# Patient Record
Sex: FEMALE | URBAN - METROPOLITAN AREA
[De-identification: names, ages, dates, MRNs, and addresses within clinical notes are randomized per-mention and may not be internally consistent; named-entity substitution may affect disease eponyms.]

---

## 2019-11-04 ENCOUNTER — INPATIENT (INPATIENT)
Facility: HOSPITAL | Age: 29
LOS: 2 days | Discharge: HOME | End: 2019-11-07
Attending: OBSTETRICS & GYNECOLOGY | Admitting: OBSTETRICS & GYNECOLOGY
Payer: MEDICAID

## 2019-11-04 VITALS — WEIGHT: 165.35 LBS | HEIGHT: 60 IN

## 2019-11-04 DIAGNOSIS — O26.899 OTHER SPECIFIED PREGNANCY RELATED CONDITIONS, UNSPECIFIED TRIMESTER: ICD-10-CM

## 2019-11-04 DIAGNOSIS — Z34.80 ENCOUNTER FOR SUPERVISION OF OTHER NORMAL PREGNANCY, UNSPECIFIED TRIMESTER: ICD-10-CM

## 2019-11-04 DIAGNOSIS — Z3A.00 WEEKS OF GESTATION OF PREGNANCY NOT SPECIFIED: ICD-10-CM

## 2019-11-04 LAB
ABO RH CONFIRMATION: SIGNIFICANT CHANGE UP
ALBUMIN SERPL ELPH-MCNC: 2.3 G/DL — LOW (ref 3.5–5)
ALP SERPL-CCNC: 165 U/L — HIGH (ref 40–120)
ALT FLD-CCNC: 17 U/L DA — SIGNIFICANT CHANGE UP (ref 10–60)
ANION GAP SERPL CALC-SCNC: 7 MMOL/L — SIGNIFICANT CHANGE UP (ref 5–17)
APPEARANCE UR: CLEAR — SIGNIFICANT CHANGE UP
APTT BLD: 29 SEC — SIGNIFICANT CHANGE UP (ref 27.5–36.3)
AST SERPL-CCNC: 20 U/L — SIGNIFICANT CHANGE UP (ref 10–40)
BASOPHILS # BLD AUTO: 0.06 K/UL — SIGNIFICANT CHANGE UP (ref 0–0.2)
BASOPHILS NFR BLD AUTO: 0.4 % — SIGNIFICANT CHANGE UP (ref 0–2)
BILIRUB SERPL-MCNC: 0.4 MG/DL — SIGNIFICANT CHANGE UP (ref 0.2–1.2)
BILIRUB UR-MCNC: NEGATIVE — SIGNIFICANT CHANGE UP
BLD GP AB SCN SERPL QL: SIGNIFICANT CHANGE UP
BUN SERPL-MCNC: 14 MG/DL — SIGNIFICANT CHANGE UP (ref 7–18)
CALCIUM SERPL-MCNC: 8.7 MG/DL — SIGNIFICANT CHANGE UP (ref 8.4–10.5)
CHLORIDE SERPL-SCNC: 112 MMOL/L — HIGH (ref 96–108)
CO2 SERPL-SCNC: 20 MMOL/L — LOW (ref 22–31)
COLOR SPEC: YELLOW — SIGNIFICANT CHANGE UP
CREAT ?TM UR-MCNC: 47 MG/DL — SIGNIFICANT CHANGE UP
CREAT SERPL-MCNC: 0.88 MG/DL — SIGNIFICANT CHANGE UP (ref 0.5–1.3)
DIFF PNL FLD: ABNORMAL
EOSINOPHIL # BLD AUTO: 0.12 K/UL — SIGNIFICANT CHANGE UP (ref 0–0.5)
EOSINOPHIL NFR BLD AUTO: 0.8 % — SIGNIFICANT CHANGE UP (ref 0–6)
FIBRINOGEN PPP-MCNC: 1002 MG/DL — HIGH (ref 350–510)
GLUCOSE BLDC GLUCOMTR-MCNC: 76 MG/DL — SIGNIFICANT CHANGE UP (ref 70–99)
GLUCOSE BLDC GLUCOMTR-MCNC: 80 MG/DL — SIGNIFICANT CHANGE UP (ref 70–99)
GLUCOSE SERPL-MCNC: 72 MG/DL — SIGNIFICANT CHANGE UP (ref 70–99)
GLUCOSE UR QL: NEGATIVE — SIGNIFICANT CHANGE UP
HCT VFR BLD CALC: 33.7 % — LOW (ref 34.5–45)
HGB BLD-MCNC: 11.2 G/DL — LOW (ref 11.5–15.5)
IMM GRANULOCYTES NFR BLD AUTO: 1 % — SIGNIFICANT CHANGE UP (ref 0–1.5)
INR BLD: 0.92 RATIO — SIGNIFICANT CHANGE UP (ref 0.88–1.16)
KETONES UR-MCNC: NEGATIVE — SIGNIFICANT CHANGE UP
LDH SERPL L TO P-CCNC: 123 U/L — SIGNIFICANT CHANGE UP (ref 120–225)
LEUKOCYTE ESTERASE UR-ACNC: NEGATIVE — SIGNIFICANT CHANGE UP
LYMPHOCYTES # BLD AUTO: 22.4 % — SIGNIFICANT CHANGE UP (ref 13–44)
LYMPHOCYTES # BLD AUTO: 3.25 K/UL — SIGNIFICANT CHANGE UP (ref 1–3.3)
MCHC RBC-ENTMCNC: 30.8 PG — SIGNIFICANT CHANGE UP (ref 27–34)
MCHC RBC-ENTMCNC: 33.2 GM/DL — SIGNIFICANT CHANGE UP (ref 32–36)
MCV RBC AUTO: 92.6 FL — SIGNIFICANT CHANGE UP (ref 80–100)
MONOCYTES # BLD AUTO: 0.7 K/UL — SIGNIFICANT CHANGE UP (ref 0–0.9)
MONOCYTES NFR BLD AUTO: 4.8 % — SIGNIFICANT CHANGE UP (ref 2–14)
NEUTROPHILS # BLD AUTO: 10.22 K/UL — HIGH (ref 1.8–7.4)
NEUTROPHILS NFR BLD AUTO: 70.6 % — SIGNIFICANT CHANGE UP (ref 43–77)
NITRITE UR-MCNC: NEGATIVE — SIGNIFICANT CHANGE UP
NRBC # BLD: 0 /100 WBCS — SIGNIFICANT CHANGE UP (ref 0–0)
PH UR: 6 — SIGNIFICANT CHANGE UP (ref 5–8)
PLATELET # BLD AUTO: 214 K/UL — SIGNIFICANT CHANGE UP (ref 150–400)
POTASSIUM SERPL-MCNC: 4.5 MMOL/L — SIGNIFICANT CHANGE UP (ref 3.5–5.3)
POTASSIUM SERPL-SCNC: 4.5 MMOL/L — SIGNIFICANT CHANGE UP (ref 3.5–5.3)
PROT ?TM UR-MCNC: 28 MG/DL — HIGH (ref 0–12)
PROT SERPL-MCNC: 7.1 G/DL — SIGNIFICANT CHANGE UP (ref 6–8.3)
PROT UR-MCNC: 30 MG/DL
PROTHROM AB SERPL-ACNC: 10.2 SEC — SIGNIFICANT CHANGE UP (ref 10–12.9)
RBC # BLD: 3.64 M/UL — LOW (ref 3.8–5.2)
RBC # FLD: 13.5 % — SIGNIFICANT CHANGE UP (ref 10.3–14.5)
SODIUM SERPL-SCNC: 139 MMOL/L — SIGNIFICANT CHANGE UP (ref 135–145)
SP GR SPEC: 1.01 — SIGNIFICANT CHANGE UP (ref 1.01–1.02)
URATE SERPL-MCNC: 7.6 MG/DL — HIGH (ref 2.5–7)
UROBILINOGEN FLD QL: NEGATIVE — SIGNIFICANT CHANGE UP
WBC # BLD: 14.49 K/UL — HIGH (ref 3.8–10.5)
WBC # FLD AUTO: 14.49 K/UL — HIGH (ref 3.8–10.5)

## 2019-11-04 PROCEDURE — 76818 FETAL BIOPHYS PROFILE W/NST: CPT | Mod: 26

## 2019-11-04 RX ORDER — SODIUM CHLORIDE 9 MG/ML
1000 INJECTION, SOLUTION INTRAVENOUS ONCE
Refills: 0 | Status: COMPLETED | OUTPATIENT
Start: 2019-11-04 | End: 2019-11-04

## 2019-11-04 RX ORDER — FAMOTIDINE 10 MG/ML
20 INJECTION INTRAVENOUS ONCE
Refills: 0 | Status: DISCONTINUED | OUTPATIENT
Start: 2019-11-04 | End: 2019-11-05

## 2019-11-04 RX ORDER — OXYTOCIN 10 UNIT/ML
333.33 VIAL (ML) INJECTION
Qty: 20 | Refills: 0 | Status: DISCONTINUED | OUTPATIENT
Start: 2019-11-04 | End: 2019-11-05

## 2019-11-04 RX ORDER — CEFAZOLIN SODIUM 1 G
2000 VIAL (EA) INJECTION ONCE
Refills: 0 | Status: COMPLETED | OUTPATIENT
Start: 2019-11-04 | End: 2019-11-04

## 2019-11-04 RX ORDER — SODIUM CHLORIDE 9 MG/ML
1000 INJECTION, SOLUTION INTRAVENOUS
Refills: 0 | Status: DISCONTINUED | OUTPATIENT
Start: 2019-11-04 | End: 2019-11-04

## 2019-11-04 RX ORDER — METOCLOPRAMIDE HCL 10 MG
10 TABLET ORAL ONCE
Refills: 0 | Status: DISCONTINUED | OUTPATIENT
Start: 2019-11-04 | End: 2019-11-05

## 2019-11-04 RX ORDER — CITRIC ACID/SODIUM CITRATE 300-500 MG
30 SOLUTION, ORAL ORAL ONCE
Refills: 0 | Status: COMPLETED | OUTPATIENT
Start: 2019-11-04 | End: 2019-11-04

## 2019-11-04 RX ADMIN — Medication 100 MILLIGRAM(S): at 23:00

## 2019-11-04 RX ADMIN — Medication 30 MILLILITER(S): at 23:01

## 2019-11-04 RX ADMIN — Medication 1000 MILLIUNIT(S)/MIN: at 23:48

## 2019-11-04 RX ADMIN — SODIUM CHLORIDE 2000 MILLILITER(S): 9 INJECTION, SOLUTION INTRAVENOUS at 21:05

## 2019-11-05 DIAGNOSIS — O14.90 UNSPECIFIED PRE-ECLAMPSIA, UNSPECIFIED TRIMESTER: ICD-10-CM

## 2019-11-05 DIAGNOSIS — O24.419 GESTATIONAL DIABETES MELLITUS IN PREGNANCY, UNSPECIFIED CONTROL: ICD-10-CM

## 2019-11-05 DIAGNOSIS — I10 ESSENTIAL (PRIMARY) HYPERTENSION: ICD-10-CM

## 2019-11-05 LAB
BASOPHILS # BLD AUTO: 0.04 K/UL — SIGNIFICANT CHANGE UP (ref 0–0.2)
BASOPHILS NFR BLD AUTO: 0.3 % — SIGNIFICANT CHANGE UP (ref 0–2)
EOSINOPHIL # BLD AUTO: 0.05 K/UL — SIGNIFICANT CHANGE UP (ref 0–0.5)
EOSINOPHIL NFR BLD AUTO: 0.4 % — SIGNIFICANT CHANGE UP (ref 0–6)
GLUCOSE BLDC GLUCOMTR-MCNC: 101 MG/DL — HIGH (ref 70–99)
GLUCOSE BLDC GLUCOMTR-MCNC: 119 MG/DL — HIGH (ref 70–99)
GLUCOSE BLDC GLUCOMTR-MCNC: 143 MG/DL — HIGH (ref 70–99)
GLUCOSE BLDC GLUCOMTR-MCNC: 98 MG/DL — SIGNIFICANT CHANGE UP (ref 70–99)
HCT VFR BLD CALC: 30.3 % — LOW (ref 34.5–45)
HGB BLD-MCNC: 9.8 G/DL — LOW (ref 11.5–15.5)
IMM GRANULOCYTES NFR BLD AUTO: 0.5 % — SIGNIFICANT CHANGE UP (ref 0–1.5)
LYMPHOCYTES # BLD AUTO: 16.3 % — SIGNIFICANT CHANGE UP (ref 13–44)
LYMPHOCYTES # BLD AUTO: 2.27 K/UL — SIGNIFICANT CHANGE UP (ref 1–3.3)
MCHC RBC-ENTMCNC: 30.3 PG — SIGNIFICANT CHANGE UP (ref 27–34)
MCHC RBC-ENTMCNC: 32.3 GM/DL — SIGNIFICANT CHANGE UP (ref 32–36)
MCV RBC AUTO: 93.8 FL — SIGNIFICANT CHANGE UP (ref 80–100)
MONOCYTES # BLD AUTO: 0.81 K/UL — SIGNIFICANT CHANGE UP (ref 0–0.9)
MONOCYTES NFR BLD AUTO: 5.8 % — SIGNIFICANT CHANGE UP (ref 2–14)
NEUTROPHILS # BLD AUTO: 10.7 K/UL — HIGH (ref 1.8–7.4)
NEUTROPHILS NFR BLD AUTO: 76.7 % — SIGNIFICANT CHANGE UP (ref 43–77)
NRBC # BLD: 0 /100 WBCS — SIGNIFICANT CHANGE UP (ref 0–0)
PLATELET # BLD AUTO: 187 K/UL — SIGNIFICANT CHANGE UP (ref 150–400)
RBC # BLD: 3.23 M/UL — LOW (ref 3.8–5.2)
RBC # FLD: 13.3 % — SIGNIFICANT CHANGE UP (ref 10.3–14.5)
T PALLIDUM AB TITR SER: NEGATIVE — SIGNIFICANT CHANGE UP
WBC # BLD: 13.94 K/UL — HIGH (ref 3.8–10.5)
WBC # FLD AUTO: 13.94 K/UL — HIGH (ref 3.8–10.5)

## 2019-11-05 RX ORDER — HEPARIN SODIUM 5000 [USP'U]/ML
5000 INJECTION INTRAVENOUS; SUBCUTANEOUS EVERY 12 HOURS
Refills: 0 | Status: DISCONTINUED | OUTPATIENT
Start: 2019-11-05 | End: 2019-11-07

## 2019-11-05 RX ORDER — DIPHENHYDRAMINE HCL 50 MG
25 CAPSULE ORAL EVERY 6 HOURS
Refills: 0 | Status: DISCONTINUED | OUTPATIENT
Start: 2019-11-05 | End: 2019-11-07

## 2019-11-05 RX ORDER — ASCORBIC ACID 60 MG
500 TABLET,CHEWABLE ORAL DAILY
Refills: 0 | Status: DISCONTINUED | OUTPATIENT
Start: 2019-11-05 | End: 2019-11-07

## 2019-11-05 RX ORDER — TETANUS TOXOID, REDUCED DIPHTHERIA TOXOID AND ACELLULAR PERTUSSIS VACCINE, ADSORBED 5; 2.5; 8; 8; 2.5 [IU]/.5ML; [IU]/.5ML; UG/.5ML; UG/.5ML; UG/.5ML
0.5 SUSPENSION INTRAMUSCULAR ONCE
Refills: 0 | Status: COMPLETED | OUTPATIENT
Start: 2019-11-05

## 2019-11-05 RX ORDER — GLYCERIN ADULT
1 SUPPOSITORY, RECTAL RECTAL AT BEDTIME
Refills: 0 | Status: DISCONTINUED | OUTPATIENT
Start: 2019-11-05 | End: 2019-11-07

## 2019-11-05 RX ORDER — LANOLIN
1 OINTMENT (GRAM) TOPICAL EVERY 6 HOURS
Refills: 0 | Status: DISCONTINUED | OUTPATIENT
Start: 2019-11-05 | End: 2019-11-07

## 2019-11-05 RX ORDER — MAGNESIUM HYDROXIDE 400 MG/1
30 TABLET, CHEWABLE ORAL DAILY
Refills: 0 | Status: DISCONTINUED | OUTPATIENT
Start: 2019-11-05 | End: 2019-11-07

## 2019-11-05 RX ORDER — SODIUM CHLORIDE 9 MG/ML
1000 INJECTION, SOLUTION INTRAVENOUS
Refills: 0 | Status: DISCONTINUED | OUTPATIENT
Start: 2019-11-05 | End: 2019-11-05

## 2019-11-05 RX ORDER — FERROUS SULFATE 325(65) MG
325 TABLET ORAL DAILY
Refills: 0 | Status: DISCONTINUED | OUTPATIENT
Start: 2019-11-05 | End: 2019-11-07

## 2019-11-05 RX ORDER — MAGNESIUM HYDROXIDE 400 MG/1
30 TABLET, CHEWABLE ORAL
Refills: 0 | Status: DISCONTINUED | OUTPATIENT
Start: 2019-11-05 | End: 2019-11-05

## 2019-11-05 RX ORDER — OXYTOCIN 10 UNIT/ML
333.33 VIAL (ML) INJECTION
Qty: 20 | Refills: 0 | Status: DISCONTINUED | OUTPATIENT
Start: 2019-11-05 | End: 2019-11-05

## 2019-11-05 RX ORDER — KETOROLAC TROMETHAMINE 30 MG/ML
30 SYRINGE (ML) INJECTION EVERY 6 HOURS
Refills: 0 | Status: DISCONTINUED | OUTPATIENT
Start: 2019-11-05 | End: 2019-11-06

## 2019-11-05 RX ORDER — ACETAMINOPHEN 500 MG
1000 TABLET ORAL ONCE
Refills: 0 | Status: COMPLETED | OUTPATIENT
Start: 2019-11-05 | End: 2019-11-05

## 2019-11-05 RX ORDER — LABETALOL HCL 100 MG
100 TABLET ORAL EVERY 12 HOURS
Refills: 0 | Status: DISCONTINUED | OUTPATIENT
Start: 2019-11-05 | End: 2019-11-07

## 2019-11-05 RX ORDER — SENNA PLUS 8.6 MG/1
2 TABLET ORAL AT BEDTIME
Refills: 0 | Status: DISCONTINUED | OUTPATIENT
Start: 2019-11-05 | End: 2019-11-07

## 2019-11-05 RX ORDER — SIMETHICONE 80 MG/1
80 TABLET, CHEWABLE ORAL EVERY 4 HOURS
Refills: 0 | Status: DISCONTINUED | OUTPATIENT
Start: 2019-11-05 | End: 2019-11-07

## 2019-11-05 RX ADMIN — Medication 30 MILLIGRAM(S): at 12:59

## 2019-11-05 RX ADMIN — Medication 30 MILLIGRAM(S): at 19:17

## 2019-11-05 RX ADMIN — Medication 400 MILLIGRAM(S): at 02:55

## 2019-11-05 RX ADMIN — Medication 325 MILLIGRAM(S): at 12:59

## 2019-11-05 RX ADMIN — Medication 100 MILLIGRAM(S): at 03:00

## 2019-11-05 RX ADMIN — Medication 1 TABLET(S): at 12:59

## 2019-11-05 RX ADMIN — Medication 30 MILLIGRAM(S): at 13:40

## 2019-11-05 RX ADMIN — SODIUM CHLORIDE 125 MILLILITER(S): 9 INJECTION, SOLUTION INTRAVENOUS at 01:17

## 2019-11-05 RX ADMIN — MAGNESIUM HYDROXIDE 30 MILLILITER(S): 400 TABLET, CHEWABLE ORAL at 12:59

## 2019-11-05 RX ADMIN — Medication 500 MILLIGRAM(S): at 12:58

## 2019-11-05 RX ADMIN — Medication 30 MILLIGRAM(S): at 18:16

## 2019-11-05 RX ADMIN — Medication 100 MILLIGRAM(S): at 12:59

## 2019-11-05 RX ADMIN — Medication 1000 MILLIGRAM(S): at 03:25

## 2019-11-05 RX ADMIN — HEPARIN SODIUM 5000 UNIT(S): 5000 INJECTION INTRAVENOUS; SUBCUTANEOUS at 13:14

## 2019-11-05 NOTE — PROGRESS NOTE ADULT - SUBJECTIVE AND OBJECTIVE BOX
PA NOTE:  POD#1 s/p rpt cs  CHTN superimposed pec w/o  features  home care to be set up with case management  GDMA2 fs ac/hs    Patient seen at bedside resting comfortably offers no new complaints. due to void post louise  denies flatus; both breastfeeding and bottle feeding. Denies HA, CP, SOB, N/V/D,  no bm; dizziness, palpitations, worsening abdominal pain, worsening vaginal bleeding, or any other concerns.     Vital Signs Last 24 Hrs  T(C): 36.7 (2019 03:37), Max: 36.7 (2019 03:37)  T(F): 98.1 (2019 03:37), Max: 98.1 (2019 03:37)  HR: 106 (2019 03:37) (104 - 114)  BP: 136/78 (2019 03:37) (110/84 - 140/75)  BP(mean): 87 (2019 03:00) (82 - 92)  RR: 18 (2019 03:37) (18 - 21)  SpO2: 98% (2019 03:37) (98% - 100%)    Gen: A&O x 3, NAD  Chest: CTABL  Cardiac: S1+S2+ RRR  Breast: Soft, nontender, nonengorged  Abdomen: +BS; soft; Nontender, nondistended, dressing removed Incision C/D/I steri strips in place   Gyn: Minimal lochia  Extremities: Nontender, DTRS 2+, no worsening edema              139  |  112<H>  |  14  ----------------------------<  72  4.5   |  20<L>  |  0.88    Ca    8.7      2019 18:28    TPro  7.1  /  Alb  2.3<L>  /  TBili  0.4  /  DBili  x   /  AST  20  /  ALT  17  /  AlkPhos  165<H>      LIVER FUNCTIONS - ( 2019 18:28 )  Alb: 2.3 g/dL / Pro: 7.1 g/dL / ALK PHOS: 165 U/L / ALT: 17 U/L DA / AST: 20 U/L / GGT: x           Urinalysis Basic - ( 2019 18:28 )    Color: Yellow / Appearance: Clear / S.010 / pH: x  Gluc: x / Ketone: Negative  / Bili: Negative / Urobili: Negative   Blood: x / Protein: 30 mg/dL / Nitrite: Negative   Leuk Esterase: Negative / RBC: 2-5 /HPF / WBC 0-2 /HPF   Sq Epi: x / Non Sq Epi: Moderate /HPF / Bacteria: Few /HPF

## 2019-11-05 NOTE — PROGRESS NOTE ADULT - ASSESSMENT
PA NOTE:  POD#1 s/p rpt cs  CHTN superimposed pec w/o  features  home care to be set up with case management  GDMA2 fs ac/hs  -diet clears  -oob ambulation  -due to void post louise  -cont post op cs care  -routine dc planning  11/7/19    MEDICATIONS  (STANDING):  ascorbic acid 500 milliGRAM(s) Oral daily  diphtheria/tetanus/pertussis (acellular) Vaccine (ADAcel) 0.5 milliLiter(s) IntraMuscular once  ferrous    sulfate 325 milliGRAM(s) Oral daily  heparin  Injectable 5000 Unit(s) SubCutaneous every 12 hours  ketorolac   Injectable 30 milliGRAM(s) IV Push every 6 hours  labetalol 100 milliGRAM(s) Oral every 12 hours  magnesium hydroxide Suspension 30 milliLiter(s) Oral daily  prenatal multivitamin 1 Tablet(s) Oral daily    MEDICATIONS  (PRN):  diphenhydrAMINE 25 milliGRAM(s) Oral every 6 hours PRN Itching  glycerin Suppository - Adult 1 Suppository(s) Rectal at bedtime PRN Constipation  lanolin Ointment 1 Application(s) Topical every 6 hours PRN Sore Nipples  senna 2 Tablet(s) Oral at bedtime PRN Constipation  simethicone 80 milliGRAM(s) Chew every 4 hours PRN Gas

## 2019-11-06 LAB
BASOPHILS # BLD AUTO: 0.04 K/UL — SIGNIFICANT CHANGE UP (ref 0–0.2)
BASOPHILS NFR BLD AUTO: 0.3 % — SIGNIFICANT CHANGE UP (ref 0–2)
EOSINOPHIL # BLD AUTO: 0.15 K/UL — SIGNIFICANT CHANGE UP (ref 0–0.5)
EOSINOPHIL NFR BLD AUTO: 1.3 % — SIGNIFICANT CHANGE UP (ref 0–6)
GLUCOSE BLDC GLUCOMTR-MCNC: 109 MG/DL — HIGH (ref 70–99)
GLUCOSE BLDC GLUCOMTR-MCNC: 129 MG/DL — HIGH (ref 70–99)
GLUCOSE BLDC GLUCOMTR-MCNC: 136 MG/DL — HIGH (ref 70–99)
GLUCOSE BLDC GLUCOMTR-MCNC: 166 MG/DL — HIGH (ref 70–99)
HCT VFR BLD CALC: 27.4 % — LOW (ref 34.5–45)
HGB BLD-MCNC: 9 G/DL — LOW (ref 11.5–15.5)
IMM GRANULOCYTES NFR BLD AUTO: 0.5 % — SIGNIFICANT CHANGE UP (ref 0–1.5)
LYMPHOCYTES # BLD AUTO: 2.47 K/UL — SIGNIFICANT CHANGE UP (ref 1–3.3)
LYMPHOCYTES # BLD AUTO: 20.6 % — SIGNIFICANT CHANGE UP (ref 13–44)
MCHC RBC-ENTMCNC: 30.5 PG — SIGNIFICANT CHANGE UP (ref 27–34)
MCHC RBC-ENTMCNC: 32.8 GM/DL — SIGNIFICANT CHANGE UP (ref 32–36)
MCV RBC AUTO: 92.9 FL — SIGNIFICANT CHANGE UP (ref 80–100)
MONOCYTES # BLD AUTO: 0.56 K/UL — SIGNIFICANT CHANGE UP (ref 0–0.9)
MONOCYTES NFR BLD AUTO: 4.7 % — SIGNIFICANT CHANGE UP (ref 2–14)
NEUTROPHILS # BLD AUTO: 8.7 K/UL — HIGH (ref 1.8–7.4)
NEUTROPHILS NFR BLD AUTO: 72.6 % — SIGNIFICANT CHANGE UP (ref 43–77)
NRBC # BLD: 0 /100 WBCS — SIGNIFICANT CHANGE UP (ref 0–0)
PLATELET # BLD AUTO: 177 K/UL — SIGNIFICANT CHANGE UP (ref 150–400)
RBC # BLD: 2.95 M/UL — LOW (ref 3.8–5.2)
RBC # FLD: 13.4 % — SIGNIFICANT CHANGE UP (ref 10.3–14.5)
WBC # BLD: 11.98 K/UL — HIGH (ref 3.8–10.5)
WBC # FLD AUTO: 11.98 K/UL — HIGH (ref 3.8–10.5)

## 2019-11-06 RX ORDER — SENNOSIDES/DOCUSATE SODIUM 8.6MG-50MG
2 TABLET ORAL
Qty: 60 | Refills: 0
Start: 2019-11-06 | End: 2019-12-05

## 2019-11-06 RX ORDER — LABETALOL HCL 100 MG
1 TABLET ORAL
Qty: 60 | Refills: 0
Start: 2019-11-06 | End: 2019-12-05

## 2019-11-06 RX ORDER — TETANUS TOXOID, REDUCED DIPHTHERIA TOXOID AND ACELLULAR PERTUSSIS VACCINE, ADSORBED 5; 2.5; 8; 8; 2.5 [IU]/.5ML; [IU]/.5ML; UG/.5ML; UG/.5ML; UG/.5ML
0.5 SUSPENSION INTRAMUSCULAR ONCE
Refills: 0 | Status: COMPLETED | OUTPATIENT
Start: 2019-11-06 | End: 2019-11-06

## 2019-11-06 RX ORDER — INFLUENZA VIRUS VACCINE 15; 15; 15; 15 UG/.5ML; UG/.5ML; UG/.5ML; UG/.5ML
0.5 SUSPENSION INTRAMUSCULAR ONCE
Refills: 0 | Status: COMPLETED | OUTPATIENT
Start: 2019-11-06 | End: 2019-11-06

## 2019-11-06 RX ORDER — FERROUS SULFATE 325(65) MG
1 TABLET ORAL
Qty: 90 | Refills: 0
Start: 2019-11-06 | End: 2019-12-05

## 2019-11-06 RX ORDER — IBUPROFEN 200 MG
1 TABLET ORAL
Qty: 120 | Refills: 0
Start: 2019-11-06 | End: 2019-12-05

## 2019-11-06 RX ORDER — IBUPROFEN 200 MG
600 TABLET ORAL EVERY 6 HOURS
Refills: 0 | Status: DISCONTINUED | OUTPATIENT
Start: 2019-11-06 | End: 2019-11-07

## 2019-11-06 RX ADMIN — Medication 600 MILLIGRAM(S): at 20:26

## 2019-11-06 RX ADMIN — Medication 100 MILLIGRAM(S): at 01:06

## 2019-11-06 RX ADMIN — Medication 600 MILLIGRAM(S): at 06:52

## 2019-11-06 RX ADMIN — Medication 1 TABLET(S): at 12:20

## 2019-11-06 RX ADMIN — HEPARIN SODIUM 5000 UNIT(S): 5000 INJECTION INTRAVENOUS; SUBCUTANEOUS at 12:21

## 2019-11-06 RX ADMIN — MAGNESIUM HYDROXIDE 30 MILLILITER(S): 400 TABLET, CHEWABLE ORAL at 12:20

## 2019-11-06 RX ADMIN — HEPARIN SODIUM 5000 UNIT(S): 5000 INJECTION INTRAVENOUS; SUBCUTANEOUS at 01:07

## 2019-11-06 RX ADMIN — Medication 600 MILLIGRAM(S): at 12:50

## 2019-11-06 RX ADMIN — Medication 325 MILLIGRAM(S): at 12:20

## 2019-11-06 RX ADMIN — INFLUENZA VIRUS VACCINE 0.5 MILLILITER(S): 15; 15; 15; 15 SUSPENSION INTRAMUSCULAR at 22:37

## 2019-11-06 RX ADMIN — Medication 600 MILLIGRAM(S): at 07:22

## 2019-11-06 RX ADMIN — Medication 100 MILLIGRAM(S): at 12:20

## 2019-11-06 RX ADMIN — Medication 600 MILLIGRAM(S): at 12:20

## 2019-11-06 RX ADMIN — Medication 500 MILLIGRAM(S): at 12:20

## 2019-11-06 RX ADMIN — TETANUS TOXOID, REDUCED DIPHTHERIA TOXOID AND ACELLULAR PERTUSSIS VACCINE, ADSORBED 0.5 MILLILITER(S): 5; 2.5; 8; 8; 2.5 SUSPENSION INTRAMUSCULAR at 22:38

## 2019-11-06 RX ADMIN — Medication 600 MILLIGRAM(S): at 21:08

## 2019-11-06 RX ADMIN — Medication 100 MILLIGRAM(S): at 20:51

## 2019-11-06 NOTE — PROVIDER CONTACT NOTE (OTHER) - BACKGROUND
rpt c/s on 11/04 @0551. EBL: 700. Patient has chronic HTN on Labetalol 100mg BID and GDM on glyburide.

## 2019-11-06 NOTE — DISCHARGE NOTE OB - CARE PROVIDER_API CALL
Norma Sultana)  Obstetrics and Gynecology  [address] [place] [address]  Phone: [phone]  Fax: [phone]  Follow Up Time:

## 2019-11-06 NOTE — DISCHARGE NOTE OB - HOSPITAL COURSE
patient presented from the office with elevated BP h/o chronic HTN @ 38 weeks 3 days  s/p rpt c/s  case mngt set up patient presented from the office with elevated BP h/o chronic HTN @ 38 weeks 3 days  s/p rpt c/s  case mngt set up  BP monitoring and glucose testing reassuring

## 2019-11-06 NOTE — PROGRESS NOTE ADULT - SUBJECTIVE AND OBJECTIVE BOX
Patient seen at bedisde resting comfortably offers no new complaints. + Ambulation, + void without difficulty, + flatus; tolerating regular diet. both breastfeeding and bottle feeding. Denies HA, CP, SOB, N/V/D,  no bm; dizziness, palpitations, worsening abdominal pain, worsening vaginal bleeding, or any other concerns.   Vital Signs Last 24 Hrs  T(C): 36.8 (06 Nov 2019 06:57), Max: 36.8 (06 Nov 2019 01:32)  T(F): 98.2 (06 Nov 2019 06:57), Max: 98.2 (06 Nov 2019 01:32)  HR: 82 (06 Nov 2019 06:57) (76 - 82)  BP: 114/76 (06 Nov 2019 06:57) (114/76 - 124/75)  BP(mean): --  RR: 16 (06 Nov 2019 06:57) (16 - 17)  SpO2: 98% (06 Nov 2019 06:57) (98% - 100%)    Gen: A&O x 3, NAD  Chest: CTABL  Cardiac: S1+S2+ RRR  Breast: Soft, nontender, nonengorged  Abdomen: +BS; soft; Nontender, nondistended, Incision C/D/I steri strips in place   Gyn: Minimal lochia  Extremities: Nontender, DTRS 2+, no worsening edema                        9.0    11.98 )-----------( 177      ( 06 Nov 2019 06:53 )             27.4       A/P: 29 year old POD #2 s/p c/s, CHTN, GDMA2, stable     -fingersticks qac qhs   -labetalol 100mg q12hrs   -Pain management as needed  -cont post op care  -OOB and ambulate  -encourage incentive spirometer use  -Encourage breastfeeding  -case management    -d/w dr. Srinivasan

## 2019-11-06 NOTE — PROGRESS NOTE ADULT - PROBLEM SELECTOR PLAN 1
-fingersticks qac qhs   -labetalol 100mg q12hrs   -Pain management as needed  -cont post op care  -OOB and ambulate  -encourage incentive spirometer use  -Encourage breastfeeding  -case management    -d/w dr. Srinivasan

## 2019-11-06 NOTE — DISCHARGE NOTE OB - MEDICATION SUMMARY - MEDICATIONS TO TAKE
I will START or STAY ON the medications listed below when I get home from the hospital:    blood pressure kit  -- Apply on skin to affected area 2 times a day   -- Indication: For Chronic hypertension    ibuprofen 600 mg oral tablet  -- 1 tab(s) by mouth every 6 hours   -- Do not take this drug if you are pregnant.  It is very important that you take or use this exactly as directed.  Do not skip doses or discontinue unless directed by your doctor.  May cause drowsiness or dizziness.  Obtain medical advice before taking any non-prescription drugs as some may affect the action of this medication.  Take with food or milk.    -- Indication: For  delivery delivered    labetalol 100 mg oral tablet  -- 1 tab(s) by mouth every 12 hours   -- It is very important that you take or use this exactly as directed.  Do not skip doses or discontinue unless directed by your doctor.  May cause drowsiness.  Alcohol may intensify this effect.  Use care when operating dangerous machinery.  Some non-prescription drugs may aggravate your condition.  Read all labels carefully.  If a warning appears, check with your doctor before taking.    -- Indication: For Chronic hypertension    ferrous sulfate 325 mg (65 mg elemental iron) oral tablet  -- 1 tab(s) by mouth 3 times a day   -- Check with your doctor before becoming pregnant.  Do not chew, break, or crush.  May discolor urine or feces.    -- Indication: For  delivery delivered    Colace 2-in-1 50 mg-8.6 mg oral tablet  -- 2 tab(s) by mouth once a day (at bedtime)   -- Medication should be taken with plenty of water.    -- Indication: For  delivery delivered

## 2019-11-06 NOTE — DISCHARGE NOTE OB - PLAN OF CARE
postop care, pain mngt, breastfeeding education no sex, nothing in the vagina for 6 weeks, no heavy lifting/pushing, no bending for 4 weeks, no strenuous activity, motrin prn for pain, keep incision clean and dry, shower daily, ambulate, fiber diet, f/u in 2 weeks for wound check and in 6 weeks for postpartum visit case mngt set up, take BP twice daily and record readings, continue labetalol 100mg q12hrs, if BP >150s/100s consistently and/or signs and symptoms of preeclampsia occur come to the [facility] or call your doctor, f/u in the office in 3 days for rpt blood pressure monitor fingerstick repeat glucose testing in 6 weeks repeat glucose testing in 6 weeks, case management to follow

## 2019-11-06 NOTE — PROVIDER CONTACT NOTE (OTHER) - ASSESSMENT
Patient's vitals at 2021 was 162/91, HR: 92. Patient denies any headache, blurry vision, epigastric pain. Patient does complain of incisional pain, will be medicated with Ibuprofen 600 mg.

## 2019-11-06 NOTE — PROVIDER CONTACT NOTE (OTHER) - ACTION/TREATMENT ORDERED:
DUANE Page notified. No further interventions ordered at this time, repeat glucose level prior to breakfast. Vital signs q 4 hrs.

## 2019-11-06 NOTE — DISCHARGE NOTE OB - PATIENT PORTAL LINK FT
You can access the FollowMyHealth Patient Portal offered by [facility] by registering at the following website: [email or web address]. By joining [facility]’s FollowMyHealth portal, you will also be able to view your health information using other applications (apps) compatible with our system.

## 2019-11-06 NOTE — DISCHARGE NOTE OB - ADDITIONAL INSTRUCTIONS
no sex, nothing in the vagina for 6 weeks, no heavy lifting/pushing, no bending for 4 weeks, no strenuous activity, motrin prn for pain, keep incision clean and dry, shower daily, ambulate, fiber diet, f/u in 2 weeks for wound check and in 6 weeks for postpartum visit    case mngt set up, take BP twice daily and record readings, continue labetalol 100mg q12hrs, if BP >150s/100s consistently and/or signs and symptoms of preeclampsia occur come to the [place] or call your doctor, f/u in the office in 3 days for rpt blood pressure

## 2019-11-06 NOTE — DISCHARGE NOTE OB - CARE PLAN
Principal Discharge DX:	 delivery delivered  Goal:	postop care, pain mngt, breastfeeding education  Assessment and plan of treatment:	no sex, nothing in the vagina for 6 weeks, no heavy lifting/pushing, no bending for 4 weeks, no strenuous activity, motrin prn for pain, keep incision clean and dry, shower daily, ambulate, fiber diet, f/u in 2 weeks for wound check and in 6 weeks for postpartum visit  Secondary Diagnosis:	Chronic hypertension  Assessment and plan of treatment:	case mngt set up, take BP twice daily and record readings, continue labetalol 100mg q12hrs, if BP >150s/100s consistently and/or signs and symptoms of preeclampsia occur come to the [place] [facility] or call your doctor, f/u in the office in 3 days for rpt blood pressure  Secondary Diagnosis:	Gestational diabetes mellitus, class A2  Goal:	monitor fingerstick  Assessment and plan of treatment:	repeat glucose testing in 6 weeks Principal Discharge DX:	 delivery delivered  Goal:	postop care, pain mngt, breastfeeding education  Assessment and plan of treatment:	no sex, nothing in the vagina for 6 weeks, no heavy lifting/pushing, no bending for 4 weeks, no strenuous activity, motrin prn for pain, keep incision clean and dry, shower daily, ambulate, fiber diet, f/u in 2 weeks for wound check and in 6 weeks for postpartum visit  Secondary Diagnosis:	Chronic hypertension  Assessment and plan of treatment:	case mngt set up, take BP twice daily and record readings, continue labetalol 100mg q12hrs, if BP >150s/100s consistently and/or signs and symptoms of preeclampsia occur come to the [facility] or call your doctor, f/u in the office in 3 days for rpt blood pressure  Secondary Diagnosis:	Gestational diabetes mellitus, class A2  Goal:	monitor fingerstick  Assessment and plan of treatment:	repeat glucose testing in 6 weeks, case management to follow

## 2019-11-07 VITALS
SYSTOLIC BLOOD PRESSURE: 129 MMHG | OXYGEN SATURATION: 99 % | RESPIRATION RATE: 17 BRPM | DIASTOLIC BLOOD PRESSURE: 78 MMHG | HEART RATE: 65 BPM | TEMPERATURE: 98 F

## 2019-11-07 LAB — GLUCOSE BLDC GLUCOMTR-MCNC: 94 MG/DL — SIGNIFICANT CHANGE UP (ref 70–99)

## 2019-11-07 PROCEDURE — 84156 ASSAY OF PROTEIN URINE: CPT

## 2019-11-07 PROCEDURE — 85027 COMPLETE CBC AUTOMATED: CPT

## 2019-11-07 PROCEDURE — 85384 FIBRINOGEN ACTIVITY: CPT

## 2019-11-07 PROCEDURE — 84550 ASSAY OF BLOOD/URIC ACID: CPT

## 2019-11-07 PROCEDURE — 90686 IIV4 VACC NO PRSV 0.5 ML IM: CPT

## 2019-11-07 PROCEDURE — 85730 THROMBOPLASTIN TIME PARTIAL: CPT

## 2019-11-07 PROCEDURE — 76818 FETAL BIOPHYS PROFILE W/NST: CPT

## 2019-11-07 PROCEDURE — 86850 RBC ANTIBODY SCREEN: CPT

## 2019-11-07 PROCEDURE — 82962 GLUCOSE BLOOD TEST: CPT

## 2019-11-07 PROCEDURE — 80053 COMPREHEN METABOLIC PANEL: CPT

## 2019-11-07 PROCEDURE — 36415 COLL VENOUS BLD VENIPUNCTURE: CPT

## 2019-11-07 PROCEDURE — 59025 FETAL NON-STRESS TEST: CPT

## 2019-11-07 PROCEDURE — 86780 TREPONEMA PALLIDUM: CPT

## 2019-11-07 PROCEDURE — 59050 FETAL MONITOR W/REPORT: CPT

## 2019-11-07 PROCEDURE — 82570 ASSAY OF URINE CREATININE: CPT

## 2019-11-07 PROCEDURE — 83615 LACTATE (LD) (LDH) ENZYME: CPT

## 2019-11-07 PROCEDURE — 85610 PROTHROMBIN TIME: CPT

## 2019-11-07 PROCEDURE — 86923 COMPATIBILITY TEST ELECTRIC: CPT

## 2019-11-07 PROCEDURE — 86901 BLOOD TYPING SEROLOGIC RH(D): CPT

## 2019-11-07 PROCEDURE — 81001 URINALYSIS AUTO W/SCOPE: CPT

## 2019-11-07 PROCEDURE — G0463: CPT

## 2019-11-07 PROCEDURE — 90715 TDAP VACCINE 7 YRS/> IM: CPT

## 2019-11-07 PROCEDURE — 86900 BLOOD TYPING SEROLOGIC ABO: CPT

## 2019-11-07 RX ADMIN — Medication 1 TABLET(S): at 12:19

## 2019-11-07 RX ADMIN — HEPARIN SODIUM 5000 UNIT(S): 5000 INJECTION INTRAVENOUS; SUBCUTANEOUS at 01:40

## 2019-11-07 RX ADMIN — SIMETHICONE 80 MILLIGRAM(S): 80 TABLET, CHEWABLE ORAL at 12:19

## 2019-11-07 RX ADMIN — Medication 325 MILLIGRAM(S): at 12:19

## 2019-11-07 RX ADMIN — Medication 500 MILLIGRAM(S): at 12:19

## 2019-11-07 RX ADMIN — Medication 100 MILLIGRAM(S): at 12:19

## 2019-11-07 RX ADMIN — Medication 600 MILLIGRAM(S): at 01:38

## 2019-11-07 RX ADMIN — MAGNESIUM HYDROXIDE 30 MILLILITER(S): 400 TABLET, CHEWABLE ORAL at 12:19

## 2019-11-07 RX ADMIN — Medication 600 MILLIGRAM(S): at 02:20

## 2019-11-07 NOTE — PROGRESS NOTE ADULT - PROBLEM SELECTOR PLAN 3
case mgmt arranged for home care   discussed diet and glucose management  follow up in office 6 wks for pp glucose check

## 2019-11-07 NOTE — PROGRESS NOTE ADULT - SUBJECTIVE AND OBJECTIVE BOX
Patient evaluated at bedside, offers no acute complaints.  Resting comfortably with baby at bedside.  Tolerating regulat diet, Voiding without difficulty; +flatus, +BM  Currently breast and bottlefeeding.  Denies fever/chills, nausea/vomiting, headache, dizziness, chest pains, shortness of breath, palpitations    Vital Signs Last 24 Hrs  T(C): 36.7 (07 Nov 2019 06:00), Max: 37.1 (06 Nov 2019 22:40)  T(F): 98.1 (07 Nov 2019 06:00), Max: 98.7 (06 Nov 2019 22:40)  HR: 65 (07 Nov 2019 06:00) (65 - 92)  BP: 129/78 (07 Nov 2019 06:00) (121/81 - 162/91)  RR: 17 (07 Nov 2019 06:00) (16 - 17)  SpO2: 99% (07 Nov 2019 06:00) (99% - 100%)    PE: Pt appears comfortable, NAD, AAOx3  Chest: CTA bilaterally, no W/R/R  Cardiac: RRR  Breasts: soft, NT/nonengorged bilaterally; no masses, no erythema  Abd: soft; NT; no guarding or rebound; +BS x4 quad; Fundus firm NT; incision C/D/I with steristrips in place, no erythema, no wound drainage or bleeding, no swelling  Gyn: minimal  Ext: soft, NT; DTRs 2+ bilaterally; no worsening edema                          9.0    11.98 )-----------( 177      ( 06 Nov 2019 06:53 )             27.4       evaluated by Dr. Srinivasan

## 2019-11-07 NOTE — PROGRESS NOTE ADULT - PROBLEM SELECTOR PROBLEM 3
Gestational diabetes mellitus, class A2

## 2019-12-01 ENCOUNTER — OUTPATIENT (OUTPATIENT)
Dept: OUTPATIENT SERVICES | Facility: HOSPITAL | Age: 29
LOS: 1 days | End: 2019-12-01
Payer: MEDICAID

## 2019-12-01 PROCEDURE — G9001: CPT

## 2019-12-11 DIAGNOSIS — Z71.89 OTHER SPECIFIED COUNSELING: ICD-10-CM

## 2021-08-09 NOTE — DISCHARGE NOTE OB - MATERIALS PROVIDED
No
Discharge Medication Information for Patients and Families Pocket Guide/Prescription for Breast Pump/  Immunization Record/MMR Vaccination (VIS Pub Date: 2012)/Tdap Vaccination (VIS Pub Date: 2012)/Shaken Baby Prevention Handout/Breastfeeding Guide and Packet/Birth Certificate Instructions/[facility]  Screening Program/Breastfeeding Log/Bottle Feeding Log/Breastfeeding Mother’s Support Group Information/Guide to Postpartum Care/[facility] Hearing Screen Program/Back To Sleep Handout/Letter of Medical Neccessity/Vaccinations
